# Patient Record
Sex: FEMALE | Race: WHITE | NOT HISPANIC OR LATINO
[De-identification: names, ages, dates, MRNs, and addresses within clinical notes are randomized per-mention and may not be internally consistent; named-entity substitution may affect disease eponyms.]

---

## 2017-07-07 PROBLEM — Z00.00 ENCOUNTER FOR PREVENTIVE HEALTH EXAMINATION: Noted: 2017-07-07

## 2017-07-26 ENCOUNTER — APPOINTMENT (OUTPATIENT)
Dept: SURGERY | Facility: CLINIC | Age: 62
End: 2017-07-26

## 2017-07-31 ENCOUNTER — APPOINTMENT (OUTPATIENT)
Dept: SURGICAL ONCOLOGY | Facility: CLINIC | Age: 62
End: 2017-07-31

## 2017-07-31 VITALS
HEIGHT: 63 IN | SYSTOLIC BLOOD PRESSURE: 144 MMHG | WEIGHT: 106 LBS | HEART RATE: 72 BPM | DIASTOLIC BLOOD PRESSURE: 85 MMHG | BODY MASS INDEX: 18.78 KG/M2 | OXYGEN SATURATION: 97 %

## 2017-07-31 DIAGNOSIS — Z86.69 PERSONAL HISTORY OF OTHER DISEASES OF THE NERVOUS SYSTEM AND SENSE ORGANS: ICD-10-CM

## 2017-07-31 DIAGNOSIS — Z86.79 PERSONAL HISTORY OF OTHER DISEASES OF THE CIRCULATORY SYSTEM: ICD-10-CM

## 2017-07-31 DIAGNOSIS — R92.8 OTHER ABNORMAL AND INCONCLUSIVE FINDINGS ON DIAGNOSTIC IMAGING OF BREAST: ICD-10-CM

## 2017-09-19 ENCOUNTER — RESULT REVIEW (OUTPATIENT)
Age: 62
End: 2017-09-19

## 2017-10-09 ENCOUNTER — OUTPATIENT (OUTPATIENT)
Dept: OUTPATIENT SERVICES | Facility: HOSPITAL | Age: 62
LOS: 1 days | End: 2017-10-09
Payer: COMMERCIAL

## 2017-10-09 VITALS
DIASTOLIC BLOOD PRESSURE: 72 MMHG | HEIGHT: 62 IN | OXYGEN SATURATION: 100 % | HEART RATE: 69 BPM | SYSTOLIC BLOOD PRESSURE: 115 MMHG | WEIGHT: 106.04 LBS | RESPIRATION RATE: 18 BRPM | TEMPERATURE: 98 F

## 2017-10-09 DIAGNOSIS — N84.0 POLYP OF CORPUS UTERI: ICD-10-CM

## 2017-10-09 DIAGNOSIS — Z98.890 OTHER SPECIFIED POSTPROCEDURAL STATES: Chronic | ICD-10-CM

## 2017-10-09 DIAGNOSIS — Z01.818 ENCOUNTER FOR OTHER PREPROCEDURAL EXAMINATION: ICD-10-CM

## 2017-10-09 LAB
HCT VFR BLD CALC: 36.9 % — SIGNIFICANT CHANGE UP (ref 34.5–45)
HGB BLD-MCNC: 12.6 G/DL — SIGNIFICANT CHANGE UP (ref 11.5–15.5)
MCHC RBC-ENTMCNC: 30.8 PG — SIGNIFICANT CHANGE UP (ref 27–34)
MCHC RBC-ENTMCNC: 34.1 GM/DL — SIGNIFICANT CHANGE UP (ref 32–36)
MCV RBC AUTO: 90.2 FL — SIGNIFICANT CHANGE UP (ref 80–100)
PLATELET # BLD AUTO: 257 K/UL — SIGNIFICANT CHANGE UP (ref 150–400)
RBC # BLD: 4.09 M/UL — SIGNIFICANT CHANGE UP (ref 3.8–5.2)
RBC # FLD: 12.8 % — SIGNIFICANT CHANGE UP (ref 10.3–14.5)
WBC # BLD: 5.48 K/UL — SIGNIFICANT CHANGE UP (ref 3.8–10.5)
WBC # FLD AUTO: 5.48 K/UL — SIGNIFICANT CHANGE UP (ref 3.8–10.5)

## 2017-10-09 PROCEDURE — 85027 COMPLETE CBC AUTOMATED: CPT

## 2017-10-09 PROCEDURE — G0463: CPT

## 2017-10-09 RX ORDER — LIDOCAINE HCL 20 MG/ML
0.2 VIAL (ML) INJECTION ONCE
Qty: 0 | Refills: 0 | Status: DISCONTINUED | OUTPATIENT
Start: 2017-10-23 | End: 2017-11-10

## 2017-10-09 RX ORDER — SODIUM CHLORIDE 9 MG/ML
3 INJECTION INTRAMUSCULAR; INTRAVENOUS; SUBCUTANEOUS EVERY 8 HOURS
Qty: 0 | Refills: 0 | Status: DISCONTINUED | OUTPATIENT
Start: 2017-10-23 | End: 2017-11-10

## 2017-10-09 RX ORDER — ACETAMINOPHEN 500 MG
975 TABLET ORAL ONCE
Qty: 0 | Refills: 0 | Status: COMPLETED | OUTPATIENT
Start: 2017-10-23 | End: 2017-10-23

## 2017-10-09 NOTE — H&P PST ADULT - HISTORY OF PRESENT ILLNESS
This is a 61 y/o female a routine sonogram revealed uterine polyp, she is asymptomatic, she presents today for surgery

## 2017-10-09 NOTE — H&P PST ADULT - PRIMARY CARE PROVIDER
DR Anthony Randhawa   [phone] DR Anthony Randhawa   [phone], pulmonologist  Dr Tran  [phone]

## 2017-10-09 NOTE — H&P PST ADULT - NSANTHOSAYNRD_GEN_A_CORE
No. PATTI screening performed.  STOP BANG Legend: 0-2 = LOW Risk; 3-4 = INTERMEDIATE Risk; 5-8 = HIGH Risk

## 2017-10-20 ENCOUNTER — TRANSCRIPTION ENCOUNTER (OUTPATIENT)
Age: 62
End: 2017-10-20

## 2017-10-23 ENCOUNTER — TRANSCRIPTION ENCOUNTER (OUTPATIENT)
Age: 62
End: 2017-10-23

## 2017-10-23 ENCOUNTER — RESULT REVIEW (OUTPATIENT)
Age: 62
End: 2017-10-23

## 2017-10-23 ENCOUNTER — OUTPATIENT (OUTPATIENT)
Dept: OUTPATIENT SERVICES | Facility: HOSPITAL | Age: 62
LOS: 1 days | End: 2017-10-23
Payer: COMMERCIAL

## 2017-10-23 VITALS
HEART RATE: 67 BPM | OXYGEN SATURATION: 100 % | DIASTOLIC BLOOD PRESSURE: 83 MMHG | RESPIRATION RATE: 17 BRPM | SYSTOLIC BLOOD PRESSURE: 130 MMHG

## 2017-10-23 VITALS
SYSTOLIC BLOOD PRESSURE: 125 MMHG | DIASTOLIC BLOOD PRESSURE: 78 MMHG | HEART RATE: 72 BPM | WEIGHT: 106.04 LBS | TEMPERATURE: 99 F | HEIGHT: 62 IN | OXYGEN SATURATION: 100 % | RESPIRATION RATE: 16 BRPM

## 2017-10-23 DIAGNOSIS — N84.0 POLYP OF CORPUS UTERI: ICD-10-CM

## 2017-10-23 DIAGNOSIS — Z98.890 OTHER SPECIFIED POSTPROCEDURAL STATES: Chronic | ICD-10-CM

## 2017-10-23 PROCEDURE — 88305 TISSUE EXAM BY PATHOLOGIST: CPT

## 2017-10-23 PROCEDURE — 58558 HYSTEROSCOPY BIOPSY: CPT

## 2017-10-23 PROCEDURE — 88305 TISSUE EXAM BY PATHOLOGIST: CPT | Mod: 26

## 2017-10-23 RX ORDER — CELECOXIB 200 MG/1
200 CAPSULE ORAL ONCE
Qty: 0 | Refills: 0 | Status: DISCONTINUED | OUTPATIENT
Start: 2017-10-23 | End: 2017-11-10

## 2017-10-23 RX ORDER — OXYCODONE HYDROCHLORIDE 5 MG/1
5 TABLET ORAL ONCE
Qty: 0 | Refills: 0 | Status: DISCONTINUED | OUTPATIENT
Start: 2017-10-23 | End: 2017-10-23

## 2017-10-23 RX ORDER — ONDANSETRON 8 MG/1
4 TABLET, FILM COATED ORAL ONCE
Qty: 0 | Refills: 0 | Status: DISCONTINUED | OUTPATIENT
Start: 2017-10-23 | End: 2017-11-10

## 2017-10-23 RX ORDER — SODIUM CHLORIDE 9 MG/ML
1000 INJECTION, SOLUTION INTRAVENOUS
Qty: 0 | Refills: 0 | Status: DISCONTINUED | OUTPATIENT
Start: 2017-10-23 | End: 2017-11-10

## 2017-10-23 RX ORDER — CELECOXIB 200 MG/1
200 CAPSULE ORAL ONCE
Qty: 0 | Refills: 0 | Status: COMPLETED | OUTPATIENT
Start: 2017-10-23 | End: 2017-10-23

## 2017-10-23 RX ADMIN — CELECOXIB 200 MILLIGRAM(S): 200 CAPSULE ORAL at 10:32

## 2017-10-23 RX ADMIN — Medication 975 MILLIGRAM(S): at 10:32

## 2017-10-23 NOTE — BRIEF OPERATIVE NOTE - PROCEDURE
<<-----Click on this checkbox to enter Procedure Polypectomy  10/23/2017    Active  LGAINEY  Hysteroscopy with dilation and curettage of uterus  10/23/2017    Active  LGAINEY

## 2017-10-23 NOTE — ASU DISCHARGE PLAN (ADULT/PEDIATRIC). - NOTIFY
Inability to Tolerate Liquids or Foods/Fever greater than 101/Swelling that continues/GYN Fever>100.4/Bleeding that does not stop/Numbness, color, or temperature change to extremity/Numbness, tingling/Pain not relieved by Medications/Unable to Urinate/Excessive Diarrhea/Increased Irritability or Sluggishness/Persistent Nausea and Vomiting

## 2017-10-23 NOTE — PRE-ANESTHESIA EVALUATION ADULT - NSANTHPMHFT_GEN_ALL_CORE
lipoma excision  no anesthetic complications  experienced brief episode of chest pain post knee procedure.   cardiac work up negative

## 2017-10-23 NOTE — BRIEF OPERATIVE NOTE - OPERATION/FINDINGS
EUA: 6week size left-retroverted uterus, no adnexal masses palpable b/l  Hysteroscopy: small polyp in right wall of KELLY, large polyp in posterior wall of cavity and large broad based polyp in right anterior wall of cavity; cavity and right/left ostia visualized and wnl, atrophic endometrium

## 2018-10-01 ENCOUNTER — RESULT REVIEW (OUTPATIENT)
Age: 63
End: 2018-10-01

## 2019-10-07 ENCOUNTER — RESULT REVIEW (OUTPATIENT)
Age: 64
End: 2019-10-07

## 2020-03-28 NOTE — BRIEF OPERATIVE NOTE - PRE-OP DX
Uterine polyp  10/23/2017    Active  Rossy Saenz No pertinent past medical history <<----- Click to add NO pertinent Past Medical History

## 2021-01-06 ENCOUNTER — RESULT REVIEW (OUTPATIENT)
Age: 66
End: 2021-01-06

## 2021-11-04 PROBLEM — I34.1 NONRHEUMATIC MITRAL (VALVE) PROLAPSE: Chronic | Status: ACTIVE | Noted: 2017-10-09

## 2021-11-04 PROBLEM — G43.909 MIGRAINE, UNSPECIFIED, NOT INTRACTABLE, WITHOUT STATUS MIGRAINOSUS: Chronic | Status: ACTIVE | Noted: 2017-10-09

## 2021-11-05 ENCOUNTER — APPOINTMENT (OUTPATIENT)
Dept: OBGYN | Facility: CLINIC | Age: 66
End: 2021-11-05
Payer: MEDICARE

## 2021-11-05 ENCOUNTER — TRANSCRIPTION ENCOUNTER (OUTPATIENT)
Age: 66
End: 2021-11-05

## 2021-11-05 VITALS — SYSTOLIC BLOOD PRESSURE: 110 MMHG | DIASTOLIC BLOOD PRESSURE: 70 MMHG

## 2021-11-05 DIAGNOSIS — R39.9 UNSPECIFIED SYMPTOMS AND SIGNS INVOLVING THE GENITOURINARY SYSTEM: ICD-10-CM

## 2021-11-05 DIAGNOSIS — N95.2 POSTMENOPAUSAL ATROPHIC VAGINITIS: ICD-10-CM

## 2021-11-05 DIAGNOSIS — N76.0 ACUTE VAGINITIS: ICD-10-CM

## 2021-11-05 PROCEDURE — 99000 SPECIMEN HANDLING OFFICE-LAB: CPT

## 2021-11-05 PROCEDURE — 99214 OFFICE O/P EST MOD 30 MIN: CPT | Mod: 25

## 2021-11-05 PROCEDURE — 81003 URINALYSIS AUTO W/O SCOPE: CPT | Mod: QW

## 2021-11-05 RX ORDER — ESTRADIOL 10 UG/1
10 TABLET, FILM COATED VAGINAL
Qty: 35 | Refills: 3 | Status: ACTIVE | COMMUNITY
Start: 2021-11-05 | End: 1900-01-01

## 2021-11-05 NOTE — PHYSICAL EXAM
[Soft] : soft [Non-tender] : non-tender [Non-distended] : non-distended [No HSM] : No HSM [No Lesions] : no lesions [No Mass] : no mass [Vulvar Atrophy] : vulvar atrophy [Labia Majora] : normal [Labia Minora] : normal [Atrophy] : atrophy [Normal] : normal [Uterine Adnexae] : normal [FreeTextEntry7] : No CVAT [FreeTextEntry4] : no discharge

## 2021-11-05 NOTE — HISTORY OF PRESENT ILLNESS
[FreeTextEntry1] : 67 yo G2, PM patient, with UTI symptoms and vaginitis.  She was treated for UTI last week. SHe has urinary urgency and frequency, vaginal dryness, vulvar edema. No new discharge, no abdominal or pelvic pain, no new back pain.  She is currently sexually active, and has dryness on intercourse

## 2021-11-05 NOTE — PLAN
[FreeTextEntry1] : Acute Vaginits and UTI symtpoms\par UA/Ucx\par Check Affirm\par \par PM dryness\par Using Revarie for vaginal dryness\par Vagifem started: R/B discussed\par OTC luibrication discussed\par \par RTO for Annual or prn

## 2021-11-08 LAB
BACTERIA UR CULT: NORMAL
CANDIDA VAG CYTO: NOT DETECTED
G VAGINALIS+PREV SP MTYP VAG QL MICRO: NOT DETECTED
T VAGINALIS VAG QL WET PREP: NOT DETECTED

## 2022-09-29 ENCOUNTER — APPOINTMENT (OUTPATIENT)
Dept: OBGYN | Facility: CLINIC | Age: 67
End: 2022-09-29

## 2022-09-29 VITALS
BODY MASS INDEX: 18.78 KG/M2 | DIASTOLIC BLOOD PRESSURE: 65 MMHG | HEIGHT: 63 IN | WEIGHT: 106 LBS | SYSTOLIC BLOOD PRESSURE: 130 MMHG

## 2022-09-29 DIAGNOSIS — Z01.419 ENCOUNTER FOR GYNECOLOGICAL EXAMINATION (GENERAL) (ROUTINE) W/OUT ABNORMAL FINDINGS: ICD-10-CM

## 2022-09-29 PROCEDURE — 99397 PER PM REEVAL EST PAT 65+ YR: CPT | Mod: GY

## 2022-09-29 PROCEDURE — G0328 FECAL BLOOD SCRN IMMUNOASSAY: CPT | Mod: QW

## 2022-09-30 LAB — HPV HIGH+LOW RISK DNA PNL CVX: NOT DETECTED

## 2022-10-06 LAB — CYTOLOGY CVX/VAG DOC THIN PREP: NORMAL

## 2022-10-20 ENCOUNTER — APPOINTMENT (RX ONLY)
Dept: URBAN - METROPOLITAN AREA CLINIC 23 | Facility: CLINIC | Age: 67
Setting detail: DERMATOLOGY
End: 2022-10-20

## 2022-10-20 DIAGNOSIS — Z41.9 ENCOUNTER FOR PROCEDURE FOR PURPOSES OTHER THAN REMEDYING HEALTH STATE, UNSPECIFIED: ICD-10-CM

## 2022-10-20 PROCEDURE — ? HYDRAFACIAL

## 2022-10-20 ASSESSMENT — LOCATION DETAILED DESCRIPTION DERM: LOCATION DETAILED: LEFT INFERIOR CENTRAL MALAR CHEEK

## 2022-10-20 ASSESSMENT — LOCATION ZONE DERM: LOCATION ZONE: FACE

## 2022-10-20 ASSESSMENT — LOCATION SIMPLE DESCRIPTION DERM: LOCATION SIMPLE: LEFT CHEEK

## 2022-10-20 NOTE — PROCEDURE: HYDRAFACIAL
Tip: Hydropeel Tip, Teal
Vacuum Pressure High Setting (Will Not Render If Set To 0): 0
Consent: Written consent obtained, risks reviewed including but not limited to crusting, scabbing, blistering, scarring, darker or lighter pigmentary change, bruising, and/or incomplete response.
Procedure: Fusion
Tip: Hydropeel Tip, Clear
Post-Care Instructions: I reviewed with the patient in detail post-care instructions. Patient should stay away from the sun and wear sun protection until treated areas are fully healed.
Treatment Number: 1
Solution: Beta-HD
Tip Override
Procedure: Peel
Price (Use Numbers Only, No Special Characters Or $): Kristin [phone]
Tip: Hydropeel Tip, Blue
Indication: skin texture
Procedure: Boost
Solution Override
Procedure: Exfoliation
Procedure: Extend and Protect
Solution: GlySal 7.5%
Procedure: Extraction
Solution: Activ-4
Location: face

## 2022-11-08 ENCOUNTER — ASOB RESULT (OUTPATIENT)
Age: 67
End: 2022-11-08

## 2022-11-08 ENCOUNTER — APPOINTMENT (OUTPATIENT)
Dept: OBGYN | Facility: CLINIC | Age: 67
End: 2022-11-08

## 2022-11-08 PROCEDURE — 76830 TRANSVAGINAL US NON-OB: CPT

## 2022-12-01 ENCOUNTER — NON-APPOINTMENT (OUTPATIENT)
Age: 67
End: 2022-12-01

## 2023-06-02 NOTE — PRE-ANESTHESIA EVALUATION ADULT - NSANTHAIRWAYFT_ENT_ALL_CORE
from neck Propranolol Counseling:  I discussed with the patient the risks of propranolol including but not limited to low heart rate, low blood pressure, low blood sugar, restlessness and increased cold sensitivity. They should call the office if they experience any of these side effects.

## 2024-07-11 ENCOUNTER — APPOINTMENT (OUTPATIENT)
Dept: OBGYN | Facility: CLINIC | Age: 69
End: 2024-07-11

## 2024-07-11 VITALS
SYSTOLIC BLOOD PRESSURE: 144 MMHG | WEIGHT: 107 LBS | HEIGHT: 63 IN | BODY MASS INDEX: 18.96 KG/M2 | DIASTOLIC BLOOD PRESSURE: 81 MMHG

## 2024-07-11 DIAGNOSIS — Z01.411 ENCOUNTER FOR GYNECOLOGICAL EXAMINATION (GENERAL) (ROUTINE) WITH ABNORMAL FINDINGS: ICD-10-CM

## 2024-07-11 DIAGNOSIS — Z13.820 ENCOUNTER FOR SCREENING FOR OSTEOPOROSIS: ICD-10-CM

## 2024-07-11 PROCEDURE — 99397 PER PM REEVAL EST PAT 65+ YR: CPT | Mod: GY

## 2024-07-11 PROCEDURE — G0101: CPT

## 2024-07-15 LAB
CYTOLOGY CVX/VAG DOC THIN PREP: ABNORMAL
HPV HIGH+LOW RISK DNA PNL CVX: NOT DETECTED

## 2025-05-20 ENCOUNTER — NON-APPOINTMENT (OUTPATIENT)
Age: 70
End: 2025-05-20

## 2025-05-20 ENCOUNTER — APPOINTMENT (OUTPATIENT)
Dept: OPHTHALMOLOGY | Facility: CLINIC | Age: 70
End: 2025-05-20
Payer: MEDICARE

## 2025-05-20 PROCEDURE — 92134 CPTRZ OPH DX IMG PST SGM RTA: CPT

## 2025-05-20 PROCEDURE — 92201 OPSCPY EXTND RTA DRAW UNI/BI: CPT

## 2025-05-20 PROCEDURE — 92004 COMPRE OPH EXAM NEW PT 1/>: CPT
